# Patient Record
Sex: FEMALE | Race: WHITE
[De-identification: names, ages, dates, MRNs, and addresses within clinical notes are randomized per-mention and may not be internally consistent; named-entity substitution may affect disease eponyms.]

---

## 2020-01-03 ENCOUNTER — HOSPITAL ENCOUNTER (EMERGENCY)
Dept: HOSPITAL 62 - ER | Age: 35
LOS: 1 days | Discharge: HOME | End: 2020-01-04
Payer: OTHER GOVERNMENT

## 2020-01-03 DIAGNOSIS — G44.209: Primary | ICD-10-CM

## 2020-01-03 DIAGNOSIS — Z87.891: ICD-10-CM

## 2020-01-03 DIAGNOSIS — Z79.899: ICD-10-CM

## 2020-01-03 DIAGNOSIS — R11.0: ICD-10-CM

## 2020-01-03 LAB
ADD MANUAL DIFF: NO
ALBUMIN SERPL-MCNC: 4.3 G/DL (ref 3.5–5)
ALP SERPL-CCNC: 100 U/L (ref 38–126)
ANION GAP SERPL CALC-SCNC: 10 MMOL/L (ref 5–19)
APPEARANCE UR: CLEAR
APTT PPP: (no result) S
AST SERPL-CCNC: 21 U/L (ref 14–36)
BASOPHILS # BLD AUTO: 0 10^3/UL (ref 0–0.2)
BASOPHILS NFR BLD AUTO: 0.3 % (ref 0–2)
BILIRUB DIRECT SERPL-MCNC: 0.1 MG/DL (ref 0–0.4)
BILIRUB SERPL-MCNC: 0.5 MG/DL (ref 0.2–1.3)
BILIRUB UR QL STRIP: NEGATIVE
BUN SERPL-MCNC: 8 MG/DL (ref 7–20)
CALCIUM: 9 MG/DL (ref 8.4–10.2)
CHLORIDE SERPL-SCNC: 102 MMOL/L (ref 98–107)
CO2 SERPL-SCNC: 25 MMOL/L (ref 22–30)
EOSINOPHIL # BLD AUTO: 0 10^3/UL (ref 0–0.6)
EOSINOPHIL NFR BLD AUTO: 0.4 % (ref 0–6)
ERYTHROCYTE [DISTWIDTH] IN BLOOD BY AUTOMATED COUNT: 13.4 % (ref 11.5–14)
GLUCOSE SERPL-MCNC: 87 MG/DL (ref 75–110)
GLUCOSE UR STRIP-MCNC: NEGATIVE MG/DL
HCT VFR BLD CALC: 37.6 % (ref 36–47)
HGB BLD-MCNC: 12.9 G/DL (ref 12–15.5)
KETONES UR STRIP-MCNC: NEGATIVE MG/DL
LYMPHOCYTES # BLD AUTO: 0.6 10^3/UL (ref 0.5–4.7)
LYMPHOCYTES NFR BLD AUTO: 7.7 % (ref 13–45)
MCH RBC QN AUTO: 29.1 PG (ref 27–33.4)
MCHC RBC AUTO-ENTMCNC: 34.3 G/DL (ref 32–36)
MCV RBC AUTO: 85 FL (ref 80–97)
MONOCYTES # BLD AUTO: 0.3 10^3/UL (ref 0.1–1.4)
MONOCYTES NFR BLD AUTO: 3.7 % (ref 3–13)
NEUTROPHILS # BLD AUTO: 7.2 10^3/UL (ref 1.7–8.2)
NEUTS SEG NFR BLD AUTO: 87.9 % (ref 42–78)
NITRITE UR QL STRIP: NEGATIVE
PH UR STRIP: 6 [PH] (ref 5–9)
PLATELET # BLD: 324 10^3/UL (ref 150–450)
POTASSIUM SERPL-SCNC: 3.9 MMOL/L (ref 3.6–5)
PROT SERPL-MCNC: 8 G/DL (ref 6.3–8.2)
PROT UR STRIP-MCNC: NEGATIVE MG/DL
RBC # BLD AUTO: 4.43 10^6/UL (ref 3.72–5.28)
SP GR UR STRIP: 1
TOTAL CELLS COUNTED % (AUTO): 100 %
UROBILINOGEN UR-MCNC: NEGATIVE MG/DL (ref ?–2)
WBC # BLD AUTO: 8.2 10^3/UL (ref 4–10.5)

## 2020-01-03 PROCEDURE — 96375 TX/PRO/DX INJ NEW DRUG ADDON: CPT

## 2020-01-03 PROCEDURE — 87086 URINE CULTURE/COLONY COUNT: CPT

## 2020-01-03 PROCEDURE — 85025 COMPLETE CBC W/AUTO DIFF WBC: CPT

## 2020-01-03 PROCEDURE — 81001 URINALYSIS AUTO W/SCOPE: CPT

## 2020-01-03 PROCEDURE — 81025 URINE PREGNANCY TEST: CPT

## 2020-01-03 PROCEDURE — 99284 EMERGENCY DEPT VISIT MOD MDM: CPT

## 2020-01-03 PROCEDURE — 80053 COMPREHEN METABOLIC PANEL: CPT

## 2020-01-03 PROCEDURE — 96365 THER/PROPH/DIAG IV INF INIT: CPT

## 2020-01-03 PROCEDURE — 36415 COLL VENOUS BLD VENIPUNCTURE: CPT

## 2020-01-03 PROCEDURE — 71045 X-RAY EXAM CHEST 1 VIEW: CPT

## 2020-01-03 NOTE — RADIOLOGY REPORT (SQ)
EXAM DESCRIPTION: 



XR CHEST 1 VIEW



COMPLETED DATE/TME:  01/03/2020 19:47



CLINICAL HISTORY: 



34 years, Female, shortness of breath



COMPARISON:

None.



NUMBER OF VIEWS:





TECHNIQUE:





LIMITATIONS:

None.



FINDINGS:



No evidence of pulmonary infiltrate or pleural effusion.



The heart and mediastinum are unremarkable.



Pulmonary vascularity appears normal.



IMPRESSION:



Normal chest x-ray.

 



copyright 2011 [employer]- All Rights Reserved

## 2020-01-03 NOTE — ER DOCUMENT REPORT
HPI





- HPI


Time Seen by Provider: 01/03/20 18:24


Pain Level: 5


Context: 





Patient is a 34-year-old female who presents to the emergency department with an

"allergic reaction" to Bactrim.  Patient states that she was started on it 

yesterday and took her first dose last night.  She woke up this morning and she 

ended up having a headache.  She states the headache is in the back of her head.

 Patient denies any itchiness.  She states that she is short of breath, although

she does not appear short of breath.





- CONSTITUTIONAL


Constitutional: DENIES: Fever, Chills





- EENT


EENT: DENIES: Sore Throat, Ear Pain, Nasal Drainage-Clear, Congestion, Eye 

problems





- NEURO


Neurology: REPORTS: Headache.  DENIES: Weakness, Vision blurred, Dizzinesss / 

Vertigo





- RESPIRATORY


Respiratory: REPORTS: Trouble Breathing.  DENIES: Coughing





- GASTROINTESTINAL


Gastrointestinal: DENIES: Abdominal Pain





- MUSCULOSKELETAL


Musculoskeletal: DENIES: Extremity pain





- DERM


Skin Color: Normal


Skin Problems: None





Past Medical History





- Social History


Smoking Status: Former Smoker


Chew tobacco use (# tins/day): No


Frequency of alcohol use: None


Drug Abuse: None


Patient has suicidal ideation: No


Patient has homicidal ideation: No





Vertical Provider Document





- CONSTITUTIONAL


Agree With Documented VS: Yes


Exam Limitations: No Limitations


General Appearance: No Apparent Distress





- HEENT


HEENT: Atraumatic, Normocephalic, PERRLA





- RESPIRATORY


Respiratory: No Respiratory Distress





- CARDIOVASCULAR


Cardiovascular: Regular Rate, Regular Rhythm


Pulses: Normal: Radial





- MUSCULOSKELETAL/EXTREMETIES


Musculoskeletal/Extremeties: FROM





- NEURO


Level of Consciousness: Awake, Alert, Appropriate





- DERM


Integumentary: Warm, Dry, No Rash





Course





- Vital Signs


Vital signs: 


                                        











Temp Pulse Resp BP Pulse Ox


 


 98.7 F   97   20   130/84 H  97 


 


 01/03/20 18:07  01/03/20 18:07  01/03/20 18:07  01/03/20 18:07  01/03/20 18:07

## 2020-01-03 NOTE — ER DOCUMENT REPORT
ED General





- General


Chief Complaint: Headache


Stated Complaint: POSSIBLE ALLERGIC REACTION


Time Seen by Provider: 01/03/20 18:24


Primary Care Provider: 


SHANTE MORENO PA-C [Primary Care Provider] - Follow up as needed


Notes: 





Patient is a 34-year-old female that comes emergency department for chief 

complaint of a headache.  She states that she started getting this about 10 AM 

this morning while she was at work, she states it was throbbing, started in her 

neck and came around to her forehead with a tightening sensation.  She states 

that she got nauseated and became concerned.  She denies vomiting, visual 

changes, head or neck injury, fever, focal numbness or weakness.  She denies 

frequent headaches.  She takes no daily medications but she was prescribed 

Bactrim for a skin infection in her left groin/thigh.  She states the area 

actually is improved and drained a little bit of clear fluid earlier, she states

it does not hurt at this time.  She denies fever/chills.  She was given Reglan, 

Pepcid, Benadryl in triage and states she still has a headache.  She denies 

rash, difficulty swallowing or breathing, or any other complaints at this time 

although she did report some sensation of shortness of breath earlier to triage 

reportedly.





- Related Data


Allergies/Adverse Reactions: 


                                        





alprazolam [From Xanax] Allergy (Verified 01/03/20 18:22)


   








Home Medications: sulfameth/trieth





Past Medical History





- General


Information source: Patient





- Social History


Smoking Status: Former Smoker


Chew tobacco use (# tins/day): No


Frequency of alcohol use: None


Drug Abuse: None


Lives with: Family


Family History: Reviewed & Not Pertinent


Patient has suicidal ideation: No


Patient has homicidal ideation: No





- Immunizations


Hx Diphtheria, Pertussis, Tetanus Vaccination: Yes





Review of Systems





- Review of Systems


Constitutional: No symptoms reported


EENT: No symptoms reported


Cardiovascular: No symptoms reported


Respiratory: No symptoms reported


Gastrointestinal: See HPI


Genitourinary: No symptoms reported


Female Genitourinary: No symptoms reported


Musculoskeletal: See HPI


Skin: No symptoms reported


Hematologic/Lymphatic: No symptoms reported


Neurological/Psychological: See HPI





Physical Exam





- Vital signs


Vitals: 


                                        











Temp Pulse Resp BP Pulse Ox


 


 98.7 F   97   20   130/84 H  97 


 


 01/03/20 18:07  01/03/20 18:07  01/03/20 18:07  01/03/20 18:07  01/03/20 18:07














- Notes


Notes: 





GENERAL: Alert, interacts well.  Patient appears mildly uncomfortable and 

restless


HEAD: Normocephalic, atraumatic.


EYES: Pupils equal, round, and reactive to light. Extraocular movements intact.


ENT: Oral mucosa moist, tongue midline. Oropharynx unremarkable. Airway patent. 

Nares patent, no nasal septal hematoma, TM's intact.


NECK: Full range of motion. Supple. Trachea midline.


LUNGS: Clear to auscultation bilaterally, no wheezes, rales, or rhonchi. No 

respiratory distress.


HEART: Regular rate and rhythm. No murmur


ABDOMEN: Soft, non-tender. Non-distended. Bowel sounds present in all 4 

quadrants.


GENITOURINARY: Deferred


EXTREMITIES: Moves all 4 extremities spontaneously. No edema, normal radial and 

dorsalis pedis pulses bilaterally. No cyanosis.


BACK: There is paracervical tenderness worse on the right but also mildly on the

left, worse with range of motion laterally to the right.  No nuchal rigidity.  

Nontender back otherwise.  No cervical, thoracic, lumbar midline tenderness. No 

saddle anesthesia, normal distal neurovascular exam. Moves all extremities in 

full range of motion.


NEUROLOGICAL: Alert and oriented x3. Normal speech. Cranial nerves II through 

XII grossly intact. 


PSYCH: Normal affect, normal mood.


SKIN: Warm, dry, normal turgor. No rashes or lesions noted.





Course





- Re-evaluation


Re-evalutation: 


Patient with tight paracervical muscles worse on the right, describes headache 

as a band around her forehead, suggestive of tension headache.  Patient is well-

appearing and nontoxic with a normal neurological exam.  I see no signs of 

allergic reaction, as it explained how the headache could be from Bactrim but is

still a classic tension type.  Treating for tension headache and reassessing.





On reevaluation after IV medications patient smiling, states her headache is 

completely gone, has no current complaints.  She does request alternative 

antibiotic medication to the Bactrim and she was provided with this.  I did 

provide her with treatments for suspected tension headache component.  Provided 

with work release.  Discussed expectations, follow-up, return precautions.  

Patient states understanding and agreement.








- Vital Signs


Vital signs: 


                                        











Temp Pulse Resp BP Pulse Ox


 


 98.2 F   76   20   122/75   95 


 


 01/04/20 02:03  01/04/20 02:03  01/04/20 02:03  01/04/20 02:03  01/04/20 02:03














- Laboratory


Result Diagrams: 


                                 01/03/20 20:28





                                 01/03/20 20:28


Laboratory results interpreted by me: 


                                        











  01/03/20 01/03/20





  20:28 20:28


 


Lymph % (Auto)  7.7 L 


 


Seg Neutrophils %  87.9 H 


 


Ur Leukocyte Esterase   MODERATE H


 


Urine Ascorbic Acid   40 H














Discharge





- Discharge


Clinical Impression: 


Headache


Qualifiers:


 Headache type: tension-type Headache chronicity pattern: acute headache 

Intractability: not intractable Qualified Code(s): G44.209 - Tension-type 

headache, unspecified, not intractable





Condition: Stable


Disposition: HOME, SELF-CARE


Additional Instructions: 


Your work-up is reassuring, your evaluation is most consistent with a tension 

type headache causing your symptoms.


I recommend the muscle relaxer as prescribed, heat and massage to the neck, and 

the Fioricet if needed if headache develops.  Follow-up with primary care for 

additional management.





You have been prescribed an alternative antibiotic to take instead of the 

Bactrim although I am not certain the Bactrim caused your symptoms today.





Return for any concerning symptoms including severe worsening headache, 

vomiting, fever, spreading redness, or any other concerning symptoms.


Prescriptions: 


Butalb/Acetaminophen/Caffeine [Fioricet (-40 mg) Tablet] 1 tab PO Q4HP PRN

#20 tab


 PRN Reason: 


Doxycycline Hyclate 100 mg PO BID #14 capsule


Methocarbamol [Robaxin-750] 750 mg PO QID PRN #20 tablet


 PRN Reason: 


Forms:  Return to Work


Referrals: 


SHANTE MORENO PA-C [Primary Care Provider] - Follow up as needed

## 2020-01-03 NOTE — ER DOCUMENT REPORT
ED Medical Screen (RME)





- General


Chief Complaint: Headache


Stated Complaint: POSSIBLE ALLERGIC REACTION


Time Seen by Provider: 01/03/20 18:24


Notes: 





Patient is a 34-year-old female who presents to the emergency department with an

"allergic reaction" to Bactrim.  Patient states that she was started on it 

yesterday and took her first dose last night.  She woke up this morning and she 

ended up having a headache.  She states the headache is in the back of her head.

 Patient denies any itchiness.  She states that she is short of breath, although

she does not appear short of breath.





Exam: Breath sounds clear. No acute distress noted. 





01/03/20 19:45


Patient was initially seen in [facility] and given Benadryl, Phenergan, and 

Epson.  She states that she does not feel any relief of her symptoms.  I 

explained to her that since she is not any better, she will have labs drawn.  I 

will also order IV fluids.





I have greeted and performed a rapid initial assessment of this patient.  A 

comprehensive ED assessment and evaluation of the patient, analysis of test 

results and completion of medical decision making process will be conducted by 

an additional ED providers.











- Related Data


Allergies/Adverse Reactions: 


                                        





alprazolam [From Xanax] Allergy (Verified 01/03/20 18:22)


   








Home Medications: sulfameth/trieth





Past Medical History





- Social History


Chew tobacco use (# tins/day): No


Frequency of alcohol use: None


Drug Abuse: None





Physical Exam





- Vital signs


Vitals: 





                                        











Temp Pulse Resp BP Pulse Ox


 


 98.7 F   97   20   130/84 H  97 


 


 01/03/20 18:07  01/03/20 18:07  01/03/20 18:07  01/03/20 18:07  01/03/20 18:07














Course





- Vital Signs


Vital signs: 





                                        











Temp Pulse Resp BP Pulse Ox


 


 98.7 F   97   20   130/84 H  97 


 


 01/03/20 18:07  01/03/20 18:07  01/03/20 18:07  01/03/20 18:07  01/03/20 18:07

## 2020-01-04 VITALS — SYSTOLIC BLOOD PRESSURE: 122 MMHG | DIASTOLIC BLOOD PRESSURE: 75 MMHG
